# Patient Record
Sex: MALE | Race: WHITE | NOT HISPANIC OR LATINO | Employment: FULL TIME | URBAN - METROPOLITAN AREA
[De-identification: names, ages, dates, MRNs, and addresses within clinical notes are randomized per-mention and may not be internally consistent; named-entity substitution may affect disease eponyms.]

---

## 2024-03-28 ENCOUNTER — APPOINTMENT (OUTPATIENT)
Dept: LAB | Facility: CLINIC | Age: 39
End: 2024-03-28

## 2024-04-02 ENCOUNTER — APPOINTMENT (EMERGENCY)
Dept: RADIOLOGY | Facility: HOSPITAL | Age: 39
End: 2024-04-02
Payer: OTHER MISCELLANEOUS

## 2024-04-02 ENCOUNTER — HOSPITAL ENCOUNTER (EMERGENCY)
Facility: HOSPITAL | Age: 39
Discharge: HOME | End: 2024-04-02
Attending: EMERGENCY MEDICINE
Payer: OTHER MISCELLANEOUS

## 2024-04-02 VITALS
WEIGHT: 300 LBS | SYSTOLIC BLOOD PRESSURE: 115 MMHG | DIASTOLIC BLOOD PRESSURE: 71 MMHG | RESPIRATION RATE: 16 BRPM | HEIGHT: 67 IN | HEART RATE: 107 BPM | OXYGEN SATURATION: 95 % | TEMPERATURE: 98.1 F | BODY MASS INDEX: 47.09 KG/M2

## 2024-04-02 DIAGNOSIS — R51.9 HEADACHE: ICD-10-CM

## 2024-04-02 DIAGNOSIS — R68.84 JAW PAIN: ICD-10-CM

## 2024-04-02 DIAGNOSIS — M25.522 LEFT ELBOW PAIN: ICD-10-CM

## 2024-04-02 DIAGNOSIS — M25.512 LEFT SHOULDER PAIN: ICD-10-CM

## 2024-04-02 DIAGNOSIS — M54.2 NECK PAIN ON LEFT SIDE: Primary | ICD-10-CM

## 2024-04-02 DIAGNOSIS — S09.90XA TRAUMATIC INJURY OF HEAD, INITIAL ENCOUNTER: ICD-10-CM

## 2024-04-02 DIAGNOSIS — M54.50 ACUTE RIGHT-SIDED LOW BACK PAIN WITHOUT SCIATICA: ICD-10-CM

## 2024-04-02 LAB
ANION GAP SERPL CALCULATED.3IONS-SCNC: 9 MMOL/L (ref 4–13)
BASOPHILS # BLD AUTO: 0.05 THOUSANDS/ÂΜL (ref 0–0.1)
BASOPHILS NFR BLD AUTO: 1 % (ref 0–1)
BUN SERPL-MCNC: 12 MG/DL (ref 5–25)
CALCIUM SERPL-MCNC: 8.5 MG/DL (ref 8.4–10.2)
CHLORIDE SERPL-SCNC: 102 MMOL/L (ref 96–108)
CO2 SERPL-SCNC: 27 MMOL/L (ref 21–32)
CREAT SERPL-MCNC: 0.77 MG/DL (ref 0.6–1.3)
EOSINOPHIL # BLD AUTO: 0.15 THOUSAND/ÂΜL (ref 0–0.61)
EOSINOPHIL NFR BLD AUTO: 2 % (ref 0–6)
ERYTHROCYTE [DISTWIDTH] IN BLOOD BY AUTOMATED COUNT: 12.4 % (ref 11.6–15.1)
GFR SERPL CREATININE-BSD FRML MDRD: 113 ML/MIN/1.73SQ M
GLUCOSE SERPL-MCNC: 224 MG/DL (ref 65–140)
HCT VFR BLD AUTO: 43.8 % (ref 36.5–49.3)
HGB BLD-MCNC: 14.6 G/DL (ref 12–17)
IMM GRANULOCYTES # BLD AUTO: 0.05 THOUSAND/UL (ref 0–0.2)
IMM GRANULOCYTES NFR BLD AUTO: 1 % (ref 0–2)
LYMPHOCYTES # BLD AUTO: 2 THOUSANDS/ÂΜL (ref 0.6–4.47)
LYMPHOCYTES NFR BLD AUTO: 25 % (ref 14–44)
MCH RBC QN AUTO: 28.5 PG (ref 26.8–34.3)
MCHC RBC AUTO-ENTMCNC: 33.3 G/DL (ref 31.4–37.4)
MCV RBC AUTO: 86 FL (ref 82–98)
MONOCYTES # BLD AUTO: 0.5 THOUSAND/ÂΜL (ref 0.17–1.22)
MONOCYTES NFR BLD AUTO: 6 % (ref 4–12)
NEUTROPHILS # BLD AUTO: 5.21 THOUSANDS/ÂΜL (ref 1.85–7.62)
NEUTS SEG NFR BLD AUTO: 65 % (ref 43–75)
NRBC BLD AUTO-RTO: 0 /100 WBCS
PLATELET # BLD AUTO: 354 THOUSANDS/UL (ref 149–390)
PMV BLD AUTO: 9.7 FL (ref 8.9–12.7)
POTASSIUM SERPL-SCNC: 3.9 MMOL/L (ref 3.5–5.3)
RBC # BLD AUTO: 5.12 MILLION/UL (ref 3.88–5.62)
SODIUM SERPL-SCNC: 138 MMOL/L (ref 135–147)
WBC # BLD AUTO: 7.96 THOUSAND/UL (ref 4.31–10.16)

## 2024-04-02 PROCEDURE — 96374 THER/PROPH/DIAG INJ IV PUSH: CPT

## 2024-04-02 PROCEDURE — 80048 BASIC METABOLIC PNL TOTAL CA: CPT

## 2024-04-02 PROCEDURE — 85025 COMPLETE CBC W/AUTO DIFF WBC: CPT

## 2024-04-02 PROCEDURE — 99284 EMERGENCY DEPT VISIT MOD MDM: CPT

## 2024-04-02 PROCEDURE — 76775 US EXAM ABDO BACK WALL LIM: CPT | Performed by: EMERGENCY MEDICINE

## 2024-04-02 PROCEDURE — 70496 CT ANGIOGRAPHY HEAD: CPT

## 2024-04-02 PROCEDURE — 96375 TX/PRO/DX INJ NEW DRUG ADDON: CPT

## 2024-04-02 PROCEDURE — 70498 CT ANGIOGRAPHY NECK: CPT

## 2024-04-02 PROCEDURE — 36415 COLL VENOUS BLD VENIPUNCTURE: CPT

## 2024-04-02 PROCEDURE — 73030 X-RAY EXAM OF SHOULDER: CPT

## 2024-04-02 PROCEDURE — 99285 EMERGENCY DEPT VISIT HI MDM: CPT | Performed by: EMERGENCY MEDICINE

## 2024-04-02 PROCEDURE — 73080 X-RAY EXAM OF ELBOW: CPT

## 2024-04-02 RX ORDER — KETOROLAC TROMETHAMINE 30 MG/ML
15 INJECTION, SOLUTION INTRAMUSCULAR; INTRAVENOUS ONCE
Status: COMPLETED | OUTPATIENT
Start: 2024-04-02 | End: 2024-04-02

## 2024-04-02 RX ORDER — ACETAMINOPHEN 325 MG/1
650 TABLET ORAL ONCE
Status: COMPLETED | OUTPATIENT
Start: 2024-04-02 | End: 2024-04-02

## 2024-04-02 RX ORDER — ONDANSETRON 4 MG/1
1 TABLET, ORALLY DISINTEGRATING ORAL ONCE
Status: COMPLETED | OUTPATIENT
Start: 2024-04-02 | End: 2024-04-02

## 2024-04-02 RX ORDER — ONDANSETRON 2 MG/ML
4 INJECTION INTRAMUSCULAR; INTRAVENOUS ONCE
Status: COMPLETED | OUTPATIENT
Start: 2024-04-02 | End: 2024-04-02

## 2024-04-02 RX ADMIN — MORPHINE SULFATE 2 MG: 2 INJECTION, SOLUTION INTRAMUSCULAR; INTRAVENOUS at 18:22

## 2024-04-02 RX ADMIN — ONDANSETRON 4 MG: 2 INJECTION INTRAMUSCULAR; INTRAVENOUS at 18:18

## 2024-04-02 RX ADMIN — KETOROLAC TROMETHAMINE 15 MG: 30 INJECTION, SOLUTION INTRAMUSCULAR; INTRAVENOUS at 18:51

## 2024-04-02 RX ADMIN — ACETAMINOPHEN 650 MG: 325 TABLET, FILM COATED ORAL at 16:49

## 2024-04-02 RX ADMIN — IOHEXOL 85 ML: 350 INJECTION, SOLUTION INTRAVENOUS at 18:16

## 2024-04-02 NOTE — DISCHARGE INSTRUCTIONS
Take Tylenol 500 mg and ibuprofen 400 mg every 6 hours as needed for pain.  Follow-up with your primary care doctor.  Return to ER for any severe headache not controlled with at home medications, neck pain, or any new numbness or weakness.

## 2024-04-02 NOTE — ED PROVIDER NOTES
History  Chief Complaint   Patient presents with    Fall     Pt was at work and fell down 4 steps and fell on L side. Pt does not remember if he hit his head but denies LOC. C/o L head, neck, and shoulder pain.     39-year-old male presents with acute onset left head, left neck, left shoulder, nausea, lightheadedness, and right lower back pain after fall down several stairs earlier today.  Denies loss of consciousness.  Patient reports tripping over a cable that was struck across the stairs.  Denies changes in vision, weakness, numbness, or any other symptoms.        Prior to Admission Medications   Prescriptions Last Dose Informant Patient Reported? Taking?   ondansetron (ZOFRAN) 4 mg tablet   No No   Sig: Take 1 tablet by mouth every 8 (eight) hours as needed for nausea or vomiting.      Facility-Administered Medications: None       Past Medical History:   Diagnosis Date    Hypertension     Kidney stones        Past Surgical History:   Procedure Laterality Date    DENTAL SURGERY      LITHOTRIPSY      TONSILECTOMY AND ADNOIDECTOMY         History reviewed. No pertinent family history.  I have reviewed and agree with the history as documented.    E-Cigarette/Vaping     E-Cigarette/Vaping Substances     Social History     Tobacco Use    Smoking status: Never   Substance Use Topics    Alcohol use: No    Drug use: No        Review of Systems   Constitutional:  Negative for chills and fever.   HENT:  Negative for ear pain and sore throat.    Eyes:  Negative for pain.   Respiratory:  Negative for cough and shortness of breath.    Cardiovascular:  Negative for chest pain and palpitations.   Gastrointestinal:  Positive for nausea. Negative for abdominal pain and vomiting.   Genitourinary:  Negative for dysuria and hematuria.   Musculoskeletal:  Positive for arthralgias. Negative for back pain.   Skin:  Negative for color change and rash.   Neurological:  Negative for seizures and syncope.   All other systems reviewed and  are negative.      Physical Exam  ED Triage Vitals [04/02/24 1506]   Temperature Pulse Respirations Blood Pressure SpO2   98.1 °F (36.7 °C) (!) 112 16 152/90 95 %      Temp Source Heart Rate Source Patient Position - Orthostatic VS BP Location FiO2 (%)   Oral Monitor Lying Left arm --      Pain Score       7             Orthostatic Vital Signs  Vitals:    04/02/24 1506 04/02/24 1830   BP: 152/90 115/71   Pulse: (!) 112 (!) 107   Patient Position - Orthostatic VS: Lying Lying       Physical Exam  Vitals and nursing note reviewed.   Constitutional:       General: He is not in acute distress.     Appearance: Normal appearance. He is well-developed. He is not ill-appearing, toxic-appearing or diaphoretic.   HENT:      Head: Normocephalic.      Comments: Tenderness to left parietal area.  No overlying hematoma or ecchymosis.  Exquisite tenderness to left anterior neck.  No midline cervical tenderness.  No audible bruit.  Head rotation limited due to pain.  Tenderness at base of left mandible.  Failed tongue blade test.  Unable to clear cervical collar.     Right Ear: Tympanic membrane, ear canal and external ear normal.      Left Ear: Tympanic membrane, ear canal and external ear normal.      Nose: Nose normal.      Mouth/Throat:      Mouth: Mucous membranes are moist.      Pharynx: Oropharynx is clear.   Eyes:      General:         Right eye: No discharge.         Left eye: No discharge.      Extraocular Movements: Extraocular movements intact.      Conjunctiva/sclera: Conjunctivae normal.      Pupils: Pupils are equal, round, and reactive to light.   Neck:      Vascular: No carotid bruit.   Cardiovascular:      Rate and Rhythm: Regular rhythm. Tachycardia present.      Pulses: Normal pulses.      Heart sounds: Normal heart sounds. No murmur heard.  Pulmonary:      Effort: Pulmonary effort is normal. No respiratory distress.      Breath sounds: Normal breath sounds. No wheezing or rales.   Chest:      Chest wall: No  tenderness.   Abdominal:      General: Abdomen is flat. There is no distension.      Palpations: Abdomen is soft.      Tenderness: There is no abdominal tenderness. There is no right CVA tenderness, left CVA tenderness or guarding.   Musculoskeletal:         General: Tenderness present. No swelling.      Cervical back: Neck supple. Tenderness present.      Comments: Tenderness to left anterior shoulder.  Range of motion limited due to pain.  No obvious deformity.  Tenderness to lumbar area right of midline.  No bruising.  No midline tenderness.   Skin:     General: Skin is warm and dry.      Capillary Refill: Capillary refill takes less than 2 seconds.      Findings: No bruising.   Neurological:      Mental Status: He is alert and oriented to person, place, and time.      Cranial Nerves: No cranial nerve deficit.      Sensory: No sensory deficit.      Motor: No weakness.      Gait: Gait normal.   Psychiatric:         Mood and Affect: Mood normal.         Behavior: Behavior normal.         ED Medications  Medications   ondansetron (FOR EMS ONLY) (ZOFRAN-ODT) 4 mg dispersible tablet 4 mg (0 mg Does not apply Given to EMS 4/2/24 1509)   acetaminophen (TYLENOL) tablet 650 mg (650 mg Oral Given 4/2/24 1649)   ondansetron (ZOFRAN) injection 4 mg (4 mg Intravenous Given 4/2/24 1818)   morphine injection 2 mg (2 mg Intravenous Given 4/2/24 1822)   iohexol (OMNIPAQUE) 350 MG/ML injection (MULTI-DOSE) 85 mL (85 mL Intravenous Given 4/2/24 1816)   ketorolac (TORADOL) injection 15 mg (15 mg Intravenous Given 4/2/24 1851)       Diagnostic Studies  Results Reviewed       Procedure Component Value Units Date/Time    Basic metabolic panel [[id number]]  (Abnormal) Collected: 04/02/24 1638    Lab Status: Final result Specimen: Blood from Arm, Right Updated: 04/02/24 1712     Sodium 138 mmol/L      Potassium 3.9 mmol/L      Chloride 102 mmol/L      CO2 27 mmol/L      ANION GAP 9 mmol/L      BUN 12 mg/dL      Creatinine 0.77 mg/dL       Glucose 224 mg/dL      Calcium 8.5 mg/dL      eGFR 113 ml/min/1.73sq m     Narrative:      National Kidney Disease Foundation guidelines for Chronic Kidney Disease (CKD):     Stage 1 with normal or high GFR (GFR > 90 mL/min/1.73 square meters)    Stage 2 Mild CKD (GFR = 60-89 mL/min/1.73 square meters)    Stage 3A Moderate CKD (GFR = 45-59 mL/min/1.73 square meters)    Stage 3B Moderate CKD (GFR = 30-44 mL/min/1.73 square meters)    Stage 4 Severe CKD (GFR = 15-29 mL/min/1.73 square meters)    Stage 5 End Stage CKD (GFR <15 mL/min/1.73 square meters)  Note: GFR calculation is accurate only with a steady state creatinine    CBC and differential [[id number]] Collected: 04/02/24 1638    Lab Status: Final result Specimen: Blood from Arm, Right Updated: 04/02/24 1657     WBC 7.96 Thousand/uL      RBC 5.12 Million/uL      Hemoglobin 14.6 g/dL      Hematocrit 43.8 %      MCV 86 fL      MCH 28.5 pg      MCHC 33.3 g/dL      RDW 12.4 %      MPV 9.7 fL      Platelets 354 Thousands/uL      nRBC 0 /100 WBCs      Neutrophils Relative 65 %      Immature Grans % 1 %      Lymphocytes Relative 25 %      Monocytes Relative 6 %      Eosinophils Relative 2 %      Basophils Relative 1 %      Neutrophils Absolute 5.21 Thousands/µL      Absolute Immature Grans 0.05 Thousand/uL      Absolute Lymphocytes 2.00 Thousands/µL      Absolute Monocytes 0.50 Thousand/µL      Eosinophils Absolute 0.15 Thousand/µL      Basophils Absolute 0.05 Thousands/µL                    CTA head and neck with and without contrast   Final Result by Juliano Valentine MD (04/02 1829)      No evidence for high-grade stenosis, focal occlusion or vascular aneurysm of the cervical or intracranial vessels. No evidence for vascular injury.                  Workstation performed: [id number]         CT recon (no charge)   Final Result by Juliano Valentine MD (04/02 1834)      No acute fractures identified of the maxillofacial bones.               Workstation performed: [id number]         XR  shoulder 2+ views LEFT   ED Interpretation by Brandon Hutchins MD (04/02 1742)   No dislocation or AC separation      XR elbow 3+ vw LEFT   ED Interpretation by Brandon Hutchins MD (04/02 1742)   No fractures            Procedures  US Guided Peripheral IV    Date/Time: 4/2/2024 4:06 PM    Performed by: Brandon Hutchins MD  Authorized by: Brandon Hutchins MD    Patient location:  ED  Performed by:  Resident  Other Assisting Provider: No    Indications:     Indications: difficulty obtaining IV access    Procedure details:     Patient evaluated for contraindications to access (i.e. fistula, thrombosis, etc): Yes      Standard clean technique used for ultrasound access: Yes      Location:  Right forearm    Catheter size:  20 gauge    Number of attempts:  1    Successful placement: yes    Post-procedure details:     Post-procedure:  Dressing applied    Assessment: free fluid flow and no signs of infiltration      Post-procedure complications: none      Patient tolerance of procedure:  Tolerated well, no immediate complications  POC Renal US    Date/Time: 4/2/2024 3:33 PM    Performed by: Brandon Hutchins MD  Authorized by: Brandon Hutchins MD    Patient location:  ED  Performed by:  Resident  Other Assisting Provider: No    Procedure details:     Exam Type:  Diagnostic    Indications: flank/back pain      Scope of abdominal ultrasound: Free fluid in retroperitoneal space.    Views obtained: right kidney      Image quality: diagnostic      Image availability:  Images available in PACS and video obtained  Findings:     RIGHT kidney findings: unremarkable    Interpretation:     Renal ultrasound impressions: normal exam          ED Course                                       Medical Decision Making  39-year-old male with diffuse tenderness after mechanical fall down several stairs.  Will order imaging to assess for vascular injury of the neck, facial bone fracture, acute intracranial hemorrhage, acute  fractures.  Back pain is right of midline with no concern for vertebral fracture or spinal cord injury.    Workup negative for vascular injury, acute hemorrhage, acute fracture, dislocations.  Patient given return precautions.    Amount and/or Complexity of Data Reviewed  Labs: ordered.  Radiology: ordered and independent interpretation performed.    Risk  OTC drugs.  Prescription drug management.          Disposition  Final diagnoses:   Left shoulder pain   Left elbow pain   Acute right-sided low back pain without sciatica   Jaw pain   Headache   Traumatic injury of head, initial encounter   Neck pain on left side     Time reflects when diagnosis was documented in both MDM as applicable and the Disposition within this note       Time User Action Codes Description Comment    4/2/2024  6:38 PM Brandon Hutchins [M25.512] Left shoulder pain     4/2/2024  6:38 PM Brandon Hutchins [M25.522] Left elbow pain     4/2/2024  6:38 PM Brandon Hutchins [M54.50] Acute right-sided low back pain without sciatica     4/2/2024  6:39 PM Brandon Hutchnis [R68.84] Jaw pain     4/2/2024  6:39 PM Brandon Hutchins [R51.9] Headache     4/2/2024  6:39 PM Brandon Hutchins [S09.90XA] Traumatic injury of head, initial encounter     4/2/2024  6:39 PM Brandon Hutchins [M54.2] Neck pain on left side     4/2/2024  6:39 PM Brandon Hutchins Modify [M25.512] Left shoulder pain     4/2/2024  6:39 PM Brandon Hutchins Modify [M54.2] Neck pain on left side           ED Disposition       ED Disposition   Discharge    Condition   Stable    Date/Time   Tue Apr 2, 2024  6:38 PM    Comment   Randell Clark discharge to home/self care.                   Follow-up Information       Follow up With Specialties Details Why Contact Info    Yvette Grace MD Family Medicine   [facility] [place] [address]  [place] [address] [phone]              Discharge Medication List as of 4/2/2024  6:40 PM        CONTINUE these medications which have NOT CHANGED    Details    ondansetron (ZOFRAN) 4 mg tablet Take 1 tablet by mouth every 8 (eight) hours as needed for nausea or vomiting., Starting 9/15/2016, Until Discontinued, Print               PDMP Review       None             ED Provider  Attending physically available and evaluated Randell Clark. I managed the patient along with the ED Attending.    Electronically Signed by           Brandon Hutchins MD  04/02/24 6423

## 2024-04-02 NOTE — Clinical Note
Randell Clark was seen and treated in our emergency department on 4/2/2024.                Diagnosis:     Randell  .    He may return on this date: 04/05/2024    No lifting over 10 lbs for 1 week.     If you have any questions or concerns, please don't hesitate to call.      Brandon Hutchins MD    ______________________________           _______________          _______________  Hospital Representative                              Date                                Time

## 2024-04-02 NOTE — ED ATTENDING ATTESTATION
4/2/2024  I, Malu Burton MD, saw and evaluated the patient. I have discussed the patient with the resident/non-physician practitioner and agree with the resident's/non-physician practitioner's findings, Plan of Care, and MDM as documented in the resident's/non-physician practitioner's note, except where noted. All available labs and Radiology studies were reviewed.  I was present for key portions of any procedure(s) performed by the resident/non-physician practitioner and I was immediately available to provide assistance.       At this point I agree with the current assessment done in the Emergency Department.  I have conducted an independent evaluation of this patient a history and physical is as follows:  39-year-old with fall downstairs.  Patient lost his balance and fell down 4-5 steep stairs in a theater.  Patient states that he went headfirst, with the first area of impact being his left shoulder, head, neck.  No loss of consciousness.  Patient complains of head pain, neck pain, dizziness, left shoulder pain, and left side pain.  No vomiting.  No numbness, tingling, or weakness.  Has been ambulatory since the event.  Review of systems otherwise -12 systems reviewed.  No thinner use.  No significant comorbid illness.  On exam the patient is awake and alert.  He is tachycardic.  He has normal work of breathing, normal mentation, normal perfusion.  He does not have signs of trauma to the head or neck, but has a very tender left lateral neck.  He has tenderness overlying his left shoulder.  The patient does not have nystagmus, but complains of vertigo.  His heart is regular without murmurs, rubs, or gallops.  His lungs are clear with good air movement.  Abdomen soft and nontender with no masses, rebound, or guarding.  The patient's extremities are intact and he is neurovascular intact with normal skin and back exam.MEDICAL DECISION MAKING    Number and Complexity of Problems  Differential diagnosis: Fall,  intracranial traumatic injury, cervical fracture, vertebral dissection, shoulder fracture, doubt cardiogenic syncope    Medical Decision Making Data  External documents reviewed:   My EKG interpretation:   My CT interpretation:   My X-ray interpretation:   My ultrasound interpretation:     CT facial bones wo contrast    (Results Pending)   XR shoulder 2+ views LEFT    (Results Pending)   XR elbow 3+ vw LEFT    (Results Pending)   CTA head and neck with and without contrast    (Results Pending)       Labs Reviewed - No data to display    Labs reviewed by me are significant for:     Clinical decision rules/scores are significant for:     Discussed case with:   Considered admission for:     Treatment and Disposition  ED course: Seen and examined, collar applied.  Will plan to CT face, head and neck, shoulder and elbow  Shared decision making:   Code status:     ED Course         Critical Care Time  Procedures

## 2024-04-03 ENCOUNTER — TELEPHONE (OUTPATIENT)
Dept: PHYSICAL THERAPY | Facility: OTHER | Age: 39
End: 2024-04-03

## 2024-04-10 ENCOUNTER — APPOINTMENT (OUTPATIENT)
Dept: URGENT CARE | Age: 39
End: 2024-04-10
Payer: OTHER MISCELLANEOUS

## 2024-04-10 DIAGNOSIS — M54.50 ACUTE RIGHT-SIDED LOW BACK PAIN WITHOUT SCIATICA: Primary | ICD-10-CM

## 2024-04-10 PROCEDURE — 99213 OFFICE O/P EST LOW 20 MIN: CPT

## 2024-04-23 ENCOUNTER — APPOINTMENT (OUTPATIENT)
Dept: URGENT CARE | Age: 39
End: 2024-04-23
Payer: OTHER MISCELLANEOUS

## 2024-04-23 ENCOUNTER — APPOINTMENT (OUTPATIENT)
Dept: RADIOLOGY | Age: 39
End: 2024-04-23
Payer: COMMERCIAL

## 2024-04-23 DIAGNOSIS — M54.50 ACUTE RIGHT-SIDED LOW BACK PAIN WITHOUT SCIATICA: Primary | ICD-10-CM

## 2024-04-23 DIAGNOSIS — M54.50 ACUTE RIGHT-SIDED LOW BACK PAIN WITHOUT SCIATICA: ICD-10-CM

## 2024-04-23 PROCEDURE — 72100 X-RAY EXAM L-S SPINE 2/3 VWS: CPT

## 2024-04-23 PROCEDURE — 99213 OFFICE O/P EST LOW 20 MIN: CPT

## 2024-04-24 ENCOUNTER — NURSE TRIAGE (OUTPATIENT)
Dept: PHYSICAL THERAPY | Facility: OTHER | Age: 39
End: 2024-04-24

## 2024-04-24 DIAGNOSIS — M54.2 ACUTE NECK PAIN: Primary | ICD-10-CM

## 2024-04-24 DIAGNOSIS — M54.50 ACUTE RIGHT-SIDED LOW BACK PAIN WITHOUT SCIATICA: ICD-10-CM

## 2024-04-24 NOTE — TELEPHONE ENCOUNTER
Additional Information   Affirmative: Is this related to a work injury?   Negative: Is this related to an MVA?   Negative: Are you currently recieving homecare services?   Negative: Has the patient had unexplained weight loss?   Negative: Does the patient have a fever?   Negative: Is the patient experiencing urine retention?   Negative: Is the patient experiencing acute drop foot or paralysis?   Negative: Has the patient experienced major trauma? (fall from height, high speed collision, direct blow to spine) and is also experiencing nausea, light-headedness, or loss of consciousness?   Negative: Is the patient experiencing blood in sputum?   Negative: Is this a chronic condition?    Background - Initial Assessment  Clinical complaint: left sided neck pain and right sided low back pain. Non radiating. States these pains are related to a work injury. States no history of back or neck issues.  Date of onset: 4/2/24  Frequency of pain: constant  Quality of pain: sharp, shooting, and stabbing    Protocols used: [facility] Protocol    Patient states the injuries are related to work injury with an active claim.    Patient informed that we can offer him a referral to our [facility] for an evaluation. Patient agreeable to the referral.  Patient informed that I would be sending the referral to that office and they would be contacting him.    No further questions and/or concerns were voiced by the patient at this time. Patient states understanding of the referral that was placed.    Referral Closed.

## 2024-04-25 ENCOUNTER — TELEPHONE (OUTPATIENT)
Age: 39
End: 2024-04-25

## 2024-04-25 NOTE — TELEPHONE ENCOUNTER
Patient is waiting on worker's comp approval for chiropractic visits before scheduling an appt. Left call back number for when patient is ready to schedule.

## 2024-04-29 VITALS
HEART RATE: 93 BPM | BODY MASS INDEX: 48.18 KG/M2 | HEIGHT: 67 IN | WEIGHT: 307 LBS | DIASTOLIC BLOOD PRESSURE: 87 MMHG | SYSTOLIC BLOOD PRESSURE: 133 MMHG

## 2024-04-29 DIAGNOSIS — M75.82 ROTATOR CUFF TENDONITIS, LEFT: Primary | ICD-10-CM

## 2024-04-29 DIAGNOSIS — S46.812A STRAIN OF LEFT SUBSCAPULARIS MUSCLE, INITIAL ENCOUNTER: ICD-10-CM

## 2024-04-29 PROCEDURE — 99203 OFFICE O/P NEW LOW 30 MIN: CPT | Performed by: ORTHOPAEDIC SURGERY

## 2024-04-29 RX ORDER — SERTRALINE HYDROCHLORIDE 100 MG/1
1 TABLET, FILM COATED ORAL DAILY
COMMUNITY
Start: 2024-02-12

## 2024-04-29 RX ORDER — OMEPRAZOLE 10 MG/1
10 CAPSULE, DELAYED RELEASE ORAL DAILY
COMMUNITY

## 2024-04-29 RX ORDER — LOSARTAN POTASSIUM AND HYDROCHLOROTHIAZIDE 25; 100 MG/1; MG/1
1 TABLET ORAL DAILY
COMMUNITY
Start: 2024-03-18

## 2024-04-29 RX ORDER — HYDROCORTISONE 25 MG/G
CREAM TOPICAL 2 TIMES DAILY
COMMUNITY
Start: 2024-04-25 | End: 2024-05-05

## 2024-04-29 RX ORDER — METFORMIN HYDROCHLORIDE 500 MG/1
1 TABLET, EXTENDED RELEASE ORAL 2 TIMES DAILY WITH MEALS
COMMUNITY
Start: 2024-04-25

## 2024-04-29 RX ORDER — IBUPROFEN 200 MG
200 TABLET ORAL EVERY 6 HOURS PRN
COMMUNITY

## 2024-04-29 RX ORDER — CEPHALEXIN 500 MG/1
500 CAPSULE ORAL 3 TIMES DAILY
COMMUNITY
Start: 2024-04-25 | End: 2024-05-02

## 2024-04-29 RX ORDER — BUPROPION HYDROCHLORIDE 150 MG/1
1 TABLET ORAL EVERY MORNING
COMMUNITY
Start: 2024-02-12

## 2024-04-29 NOTE — PROGRESS NOTES
CHIEF COMPLAIN/REASON FOR VISIT  Chief Complaint   Patient presents with    Left Shoulder - Pain       HISTORY OF PRESENT ILLNESS  Randell Clark is a RHD 39 y.o. male who presents for evaluation of their left shoulder.  This is a Worker's Compensation case.  Patient said on April 22 he was carrying things down the stairs at his work when he fell and landed on the left shoulder.  Since then, patient has reported difficulty with overhead activity and behind the back maneuvers.  He reports some initial numbness and tingling which radiated down the arm but this is since resolved.    REVIEW OF SYSTEMS  Review of systems was performed and, woutside that mentioned in the HPI, it was negative for symptomology related to the integumentary, hematologic, immunologic, allergic, neurologic, cardiovascular, respiratory, GI or  systems.     MEDICAL HISTORY  There is no problem list on file for this patient.      SURGICAL HISTORY  Past Surgical History:   Procedure Laterality Date    DENTAL SURGERY      LITHOTRIPSY      TONSILECTOMY AND ADNOIDECTOMY         CURRENT MEDICATIONS    Current Outpatient Medications:     Anusol-HC 2.5 % rectal cream, Insert into the rectum 2 (two) times a day, Disp: , Rfl:     buPROPion (WELLBUTRIN XL) 150 mg 24 hr tablet, Take 1 tablet by mouth every morning, Disp: , Rfl:     cephalexin (KEFLEX) 500 mg capsule, Take 500 mg by mouth Three times a day, Disp: , Rfl:     ibuprofen (MOTRIN) 200 mg tablet, Take 200 mg by mouth every 6 (six) hours as needed, Disp: , Rfl:     LOSARTAN POTASSIUM-HCTZ PO, , Disp: , Rfl:     losartan-hydrochlorothiazide (HYZAAR) 100-25 MG per tablet, Take 1 tablet by mouth daily, Disp: , Rfl:     metFORMIN (GLUCOPHAGE-XR) 500 mg 24 hr tablet, Take 1 tablet by mouth 2 (two) times a day with meals, Disp: , Rfl:     omeprazole (PriLOSEC) 10 mg delayed release capsule, Take 10 mg by mouth daily, Disp: , Rfl:     sertraline (ZOLOFT) 100 mg tablet, Take 1 tablet by mouth daily, Disp:  ", Rfl:     ondansetron (ZOFRAN) 4 mg tablet, Take 1 tablet by mouth every 8 (eight) hours as needed for nausea or vomiting., Disp: 20 tablet, Rfl: 0    SOCIAL HISTORY  Social History     Socioeconomic History    Marital status: /Civil Union     Spouse name: Not on file    Number of children: Not on file    Years of education: Not on file    Highest education level: Not on file   Occupational History    Not on file   Tobacco Use    Smoking status: Never    Smokeless tobacco: Not on file   Substance and Sexual Activity    Alcohol use: No    Drug use: No    Sexual activity: Not on file   Other Topics Concern    Not on file   Social History Narrative    Not on file     Social Determinants of Health     Financial Resource Strain: Not on file   Food Insecurity: Not on file   Transportation Needs: Not on file   Physical Activity: Not on file   Stress: Not on file   Social Connections: Not on file   Intimate Partner Violence: Not on file   Housing Stability: Not on file       Objective     VITAL SIGNS  /87 (BP Location: Right arm, Patient Position: Sitting, Cuff Size: Large)   Pulse 93   Ht 5' 7\" (1.702 m) Comment: verbal  Wt (!) 139 kg (307 lb)   BMI 48.08 kg/m²      PHYSICAL EXAM  General:   Well-appearing  No acute distress  Appears stated age    Left Shoulder  Positive tenderness to palpation over the acromioclavicular joint  Negative tenderness to palpation over the long head of the biceps tendon  Shoulder effusion none present  Shoulder abduction 90 / 180 (passively up to 180)  Shoulder forward flexion 90 / 180 (passively up to 180)  Shoulder internal rotation T10  Supraspinatus/ABD 4/5   Infraspinatus/ER 4/5   Negative Belly Press/SS  Positive Neer  Positive Garcia  Positive O'bill  Skin is intact with no erythema, warmth or drainage  Motor strength intact distally  Sensation to light touch is normal in the axillary, radial, ulnar, and median nerve distributions.  Fingers warm and " perfused    RADIOGRAPHIC EXAMINATION/DIAGNOSTICS:      ASSESSMENT/PLAN:  Left rotator cuff tendonitis  Left subscapularis strain    Today, we discussed conservative treatment options including but are certainly not limited to: Rest, ice, compression, elevation, activity modification, anti-inflammatory medication, physical therapy, and/or injections.  We discussed benefits of each potential treatment option.  After discussion, patient was agreeable to trying Rest, Ice, Compression, Elevation, Activity Modification, Anti-inflammatory Medication, and Physical Therapy. He declined corticosteroid injection today. We did discuss if he fails conservative measures surgery could be considered in the future. We will plan to follow up with the patient as needed.  They are understanding that if the pain should worsen or they develop new symptoms to please reach out to us sooner. Patient is understanding and agreeable to this plan.     Scribe Attestation      I,:  Cuba Patel PA-C am acting as a scribe while in the presence of the attending physician.:       I,:  Garcia Boland MD personally performed the services described in this documentation    as scribed in my presence.:

## 2024-05-03 ENCOUNTER — APPOINTMENT (OUTPATIENT)
Dept: URGENT CARE | Age: 39
End: 2024-05-03
Payer: OTHER MISCELLANEOUS

## 2024-05-03 PROCEDURE — 99213 OFFICE O/P EST LOW 20 MIN: CPT

## 2024-05-08 ENCOUNTER — TELEPHONE (OUTPATIENT)
Dept: OBGYN CLINIC | Facility: MEDICAL CENTER | Age: 39
End: 2024-05-08

## 2024-05-08 NOTE — TELEPHONE ENCOUNTER
Caller: Rain    Doctor/Office: Krystian    CB#: [phone]      What needs to be faxed: [facility] 4/29    ATTN to: CHRIS ugerra    Fax#: [phone]      Documents were successfully e-faxed

## 2024-05-13 ENCOUNTER — APPOINTMENT (OUTPATIENT)
Dept: URGENT CARE | Age: 39
End: 2024-05-13
Payer: OTHER MISCELLANEOUS

## 2024-05-13 PROCEDURE — 99214 OFFICE O/P EST MOD 30 MIN: CPT | Performed by: PHYSICIAN ASSISTANT

## 2024-05-29 ENCOUNTER — APPOINTMENT (OUTPATIENT)
Dept: URGENT CARE | Age: 39
End: 2024-05-29
Payer: OTHER MISCELLANEOUS

## 2024-05-29 PROCEDURE — 99214 OFFICE O/P EST MOD 30 MIN: CPT | Performed by: PHYSICIAN ASSISTANT

## 2024-06-12 ENCOUNTER — APPOINTMENT (OUTPATIENT)
Dept: URGENT CARE | Age: 39
End: 2024-06-12
Payer: OTHER MISCELLANEOUS

## 2024-06-12 PROCEDURE — 99214 OFFICE O/P EST MOD 30 MIN: CPT | Performed by: PHYSICIAN ASSISTANT

## 2024-06-26 ENCOUNTER — TELEPHONE (OUTPATIENT)
Age: 39
End: 2024-06-26

## 2024-06-26 ENCOUNTER — TELEPHONE (OUTPATIENT)
Dept: OBGYN CLINIC | Facility: HOSPITAL | Age: 39
End: 2024-06-26

## 2024-06-26 NOTE — TELEPHONE ENCOUNTER
Caller: Michael     Doctor: Loco     Reason for call: Please mail new patient paperwork to address on file patient is schedule on 07/29th. Thank you     Call back#: [phone]

## 2024-06-26 NOTE — TELEPHONE ENCOUNTER
Caller: Jessica JEAN    Doctor: Krystian    Reason for call: Requesting update    Call back#: [phone]

## 2024-06-28 ENCOUNTER — APPOINTMENT (OUTPATIENT)
Dept: URGENT CARE | Age: 39
End: 2024-06-28
Payer: OTHER MISCELLANEOUS

## 2024-06-28 PROCEDURE — 99213 OFFICE O/P EST LOW 20 MIN: CPT | Performed by: PHYSICIAN ASSISTANT

## 2024-07-29 ENCOUNTER — OFFICE VISIT (OUTPATIENT)
Dept: PAIN MEDICINE | Facility: CLINIC | Age: 39
End: 2024-07-29
Payer: OTHER MISCELLANEOUS

## 2024-07-29 VITALS
DIASTOLIC BLOOD PRESSURE: 82 MMHG | HEART RATE: 123 BPM | BODY MASS INDEX: 48.18 KG/M2 | HEIGHT: 67 IN | SYSTOLIC BLOOD PRESSURE: 123 MMHG | WEIGHT: 307 LBS

## 2024-07-29 DIAGNOSIS — M51.34 DDD (DEGENERATIVE DISC DISEASE), THORACIC: ICD-10-CM

## 2024-07-29 DIAGNOSIS — M48.04 THORACIC STENOSIS: ICD-10-CM

## 2024-07-29 DIAGNOSIS — M48.061 SPINAL STENOSIS OF LUMBAR REGION, UNSPECIFIED WHETHER NEUROGENIC CLAUDICATION PRESENT: ICD-10-CM

## 2024-07-29 DIAGNOSIS — M54.16 LUMBAR RADICULOPATHY: Primary | ICD-10-CM

## 2024-07-29 PROCEDURE — 99204 OFFICE O/P NEW MOD 45 MIN: CPT | Performed by: ANESTHESIOLOGY

## 2024-07-29 RX ORDER — GABAPENTIN 300 MG/1
CAPSULE ORAL
Qty: 90 CAPSULE | Refills: 1 | Status: SHIPPED | OUTPATIENT
Start: 2024-07-29 | End: 2024-09-03

## 2024-07-29 NOTE — PROGRESS NOTES
Assessment  1. Lumbar radiculopathy    2. Thoracic stenosis    3. DDD (degenerative disc disease), thoracic    4. Spinal stenosis of lumbar region, unspecified whether neurogenic claudication present        Plan  39-year-old male referred by occupational medicine regarding work-related injury sustained April 2, 2024 when the patient had a fall downstairs.  The patient is experiencing right-sided lumbosacral back pain that radiates into the right groin and anterior lateral aspect of the right lower extremity to the knee with associated paresthesias.  MRI of the lumbar spine demonstrates multiple disc bulges.  He does have a left-sided disc bulge/herniation at T11-12 contacting the anterior aspect of the left cord without any myelomalacia. Mild central stenosis at L1-2.  Mild to moderate central and right foraminal stenosis at L2-3.  Slight encroachment of the left foramen at this level.  Right extraforaminal disc protrusion with mild central and bilateral foraminal stenosis at L3-4.  Mild central with mild to moderate bilateral foraminal stenosis at L4-5.  The patient has been doing physical therapy since April 2024 without any significant improvement in pain or function.  He does take ibuprofen as needed with minimal relief.  Flexeril provided no relief.  The patient symptoms are consistent with lumbar radiculopathy.    I will schedule the patient for left L2 and L3 TFESI  I will initiate gabapentin 300 mg nightly and will titrate up to 3 times daily for his neuropathic complaints.  He was apprised of the most common side effects of gabapentin and he was given a titration schedule today's office visit  Patient may continue with physical therapy they feel he would benefit from [place] based exercises  I will follow-up with the patient in 4 weeks      Complete risks and benefits including bleeding, infection, tissue reaction, nerve injury and allergic reaction were discussed. The approach was demonstrated using  models and literature was provided. Verbal and written consent was obtained.    My impressions and treatment recommendations were discussed in detail with the patient who verbalized understanding and had no further questions.  Discharge instructions were provided. I personally saw and examined the patient and I agree with the above discussed plan of care.    No orders of the defined types were placed in this encounter.    No orders of the defined types were placed in this encounter.      History of Present Illness    Randell Clark is a 39 y.o. male referred by occupational medicine regarding work-related injury sustained April 2, 2024 when the patient had a fall downstairs.  The patient is experiencing right-sided lumbosacral back pain that radiates into the right groin and anterior lateral aspect of the right lower extremity to the knee with associated paresthesias.  He denies any left lower extremity symptoms, bladder or bowel incontinence, or saddle anesthesia.  MRI of the lumbar spine demonstrates multiple disc bulges.  He does have a left-sided disc bulge/herniation at T11-12 contacting the anterior aspect of the left cord without any myelomalacia. Mild central stenosis at L1-2.  Mild to moderate central and right foraminal stenosis at L2-3.  Slight encroachment of the left foramen at this level.  Right extraforaminal disc protrusion with mild central and bilateral foraminal stenosis at L3-4.  Mild central with mild to moderate bilateral foraminal stenosis at L4-5.  The patient has been doing physical therapy since April 2024 without any significant improvement in pain or function.  He does take ibuprofen as needed with minimal relief.  Flexeril provided no relief.  The patient rates his pain a 4 out of 10 and constant.  The pain is worse in the afternoon.  The pain is described as burning, shooting, pins-and-needles, dull, and aching.  The pain is increased with lying down, standing, bending, sitting, walking,  exercise, and sneezing.  He does find some relief with heat and ice.    Other than as stated above, the patient denies any interval changes in medications, medical condition, mental condition, symptoms, or allergies since the last office visit.    I have personally reviewed and/or updated the patient's past medical history, past surgical history, family history, social history, current medications, allergies, and vital signs today.     Review of Systems    There is no problem list on file for this patient.      Past Medical History:   Diagnosis Date    Hypertension     Kidney stones        Past Surgical History:   Procedure Laterality Date    DENTAL SURGERY      LITHOTRIPSY      TONSILECTOMY AND ADNOIDECTOMY         Family History   Problem Relation Age of Onset    Cancer Mother     Diabetes type II Brother     Sjogren's syndrome Maternal Grandmother     Lupus Maternal Grandmother     Rheum arthritis Maternal Grandmother     Cancer Maternal Grandfather     Anuerysm Paternal Grandfather         Aortic Brain Anuerysm       Social History     Occupational History    Not on file   Tobacco Use    Smoking status: Never    Smokeless tobacco: Not on file   Substance and Sexual Activity    Alcohol use: No    Drug use: No    Sexual activity: Not on file       Current Outpatient Medications on File Prior to Visit   Medication Sig    Anusol-HC 2.5 % rectal cream Insert into the rectum 2 (two) times a day    buPROPion (WELLBUTRIN XL) 150 mg 24 hr tablet Take 1 tablet by mouth every morning    ibuprofen (MOTRIN) 200 mg tablet Take 200 mg by mouth every 6 (six) hours as needed    LOSARTAN POTASSIUM-HCTZ PO     losartan-hydrochlorothiazide (HYZAAR) 100-25 MG per tablet Take 1 tablet by mouth daily    metFORMIN (GLUCOPHAGE-XR) 500 mg 24 hr tablet Take 1 tablet by mouth 2 (two) times a day with meals    omeprazole (PriLOSEC) 10 mg delayed release capsule Take 10 mg by mouth daily    ondansetron (ZOFRAN) 4 mg tablet Take 1 tablet by  "mouth every 8 (eight) hours as needed for nausea or vomiting.    sertraline (ZOLOFT) 100 mg tablet Take 1 tablet by mouth daily     No current facility-administered medications on file prior to visit.       Allergies   Allergen Reactions    Naproxen Other (See Comments)     Mouth sores       Physical Exam    /82   Pulse (!) 123   Ht 5' 7\" (1.702 m)   Wt (!) 139 kg (307 lb)   BMI 48.08 kg/m²     Constitutional: normal, well developed, well nourished, alert, in no distress and non-toxic and no overt pain behavior.  Eyes: anicteric  HEENT: grossly intact  Neck: supple, symmetric, trachea midline and no masses   Pulmonary:even and unlabored  Cardiovascular:No edema or pitting edema present  Skin:Normal without rashes or lesions and well hydrated  Psychiatric:Mood and affect appropriate  Neurologic:Cranial Nerves II-XII grossly intact  Musculoskeletal:antalgic gait.  Right lumbar paraspinals tender to palpation from L2-L5.  Right SI joint mildly tender to palpation.  Bilateral patellar and Achilles reflexes were 2 out of 4 and symmetrical.  No clonus is noted bilaterally.  Bilateral lower extremity strength out of 5 in all muscle groups with the exception of right quadriceps which is 4-5.  Sensation intact to light touch in L3-S1 dermatomes bilaterally.  Positive straight leg raise on the right and negative on the left.  Positive Poncho's test on the right and negative on the left.    Imaging  PACS Images     Show images for XR spine lumbar 2 or 3 views injury  Study Result    Narrative & Impression   XR SPINE LUMBAR 2 OR 3 VIEWS INJURY     INDICATION: M54.50: Low back pain, unspecified.     COMPARISON: None     FINDINGS:     No acute fracture. Intact pedicles.     Five non-rib-bearing lumbar vertebral bodies.     Normal alignment.     No significant degenerative changes.     Unremarkable soft tissues. Right upper quadrant surgical clips     IMPRESSION:     No acute osseous abnormality.        Workstation " performed: [id number]       PACS Images     Show images for MRI lumbar spine wo contrast  Study Result    Narrative & Impression   [id number]     Imaging    MRI lumbar spine wo contrast (Order: [id number]) - 7/29/2024    Order Report     Order Details

## 2024-08-09 ENCOUNTER — APPOINTMENT (OUTPATIENT)
Dept: URGENT CARE | Age: 39
End: 2024-08-09
Payer: OTHER MISCELLANEOUS

## 2024-08-09 PROCEDURE — 99213 OFFICE O/P EST LOW 20 MIN: CPT | Performed by: PHYSICIAN ASSISTANT

## 2024-09-06 ENCOUNTER — APPOINTMENT (OUTPATIENT)
Age: 39
End: 2024-09-06
Payer: OTHER MISCELLANEOUS

## 2024-09-06 PROCEDURE — 99213 OFFICE O/P EST LOW 20 MIN: CPT | Performed by: EMERGENCY MEDICINE

## 2024-09-10 ENCOUNTER — HOSPITAL ENCOUNTER (OUTPATIENT)
Dept: RADIOLOGY | Facility: CLINIC | Age: 39
Discharge: HOME | End: 2024-09-10
Admitting: ANESTHESIOLOGY
Payer: OTHER MISCELLANEOUS

## 2024-09-10 VITALS
OXYGEN SATURATION: 95 % | SYSTOLIC BLOOD PRESSURE: 130 MMHG | RESPIRATION RATE: 18 BRPM | TEMPERATURE: 98.4 F | DIASTOLIC BLOOD PRESSURE: 88 MMHG | HEART RATE: 103 BPM

## 2024-09-10 DIAGNOSIS — M54.16 LUMBAR RADICULOPATHY: ICD-10-CM

## 2024-09-10 PROCEDURE — 64484 NJX AA&/STRD TFRM EPI L/S EA: CPT | Performed by: ANESTHESIOLOGY

## 2024-09-10 PROCEDURE — 64483 NJX AA&/STRD TFRM EPI L/S 1: CPT | Performed by: ANESTHESIOLOGY

## 2024-09-10 RX ORDER — PAPAVERINE HCL 150 MG
15 CAPSULE, EXTENDED RELEASE ORAL ONCE
Status: COMPLETED | OUTPATIENT
Start: 2024-09-10 | End: 2024-09-10

## 2024-09-10 RX ADMIN — DEXAMETHASONE SODIUM PHOSPHATE 15 MG: 10 INJECTION, SOLUTION INTRAMUSCULAR; INTRAVENOUS at 10:37

## 2024-09-10 RX ADMIN — LIDOCAINE HYDROCHLORIDE 2 ML: 20 INJECTION, SOLUTION EPIDURAL; INFILTRATION; INTRACAUDAL; PERINEURAL at 10:32

## 2024-09-10 RX ADMIN — IOHEXOL 2 ML: 300 INJECTION, SOLUTION INTRAVENOUS at 10:35

## 2024-09-10 NOTE — DISCHARGE INSTRUCTIONS
Epidural Steroid Injection   WHAT YOU NEED TO KNOW:   An epidural steroid injection (TRISTON) is a procedure to inject steroid medicine into the epidural space. The epidural space is between your spinal cord and vertebrae. Steroids reduce inflammation and fluid buildup in your spine that may be causing pain. You may be given pain medicine along with the steroids.          ACTIVITY  Do not drive or operate machinery today.  No strenuous activity today - bending, lifting, etc.  You may resume normal activites starting tomorrow - start slowly and as tolerated.  You may shower today, but no tub baths or hot tubs.  You may have numbness for several hours from the local anesthetic. Please use caution and common sense, especially with weight-bearing activities.    CARE OF THE INJECTION SITE  If you have soreness or pain, apply ice to the area today (20 minutes on/20 minutes off).  Starting tomorrow, you may use warm, moist heat or ice if needed.  You may have an increase or change in your discomfort for 36-48 hours after your treatment.  Apply ice and continue with any pain medication you have been prescribed.  Notify the [facility] if you have any of the following: redness, drainage, swelling, headache, stiff neck or fever above 100°F.    SPECIAL INSTRUCTIONS  Our office will contact you in approximately 14 days for a progress report.    MEDICATIONS  Continue to take all routine medications.  Our office may have instructed you to hold some medications.    As no general anesthesia was used in today's procedure, you should not experience any side effects related to anesthesia.     If you are diabetic, the steroids used in today's injection may temporarily increase your blood sugar levels after the first few days after your injection. Please keep a close eye on your sugars and alert the doctor who manages your diabetes if your sugars are significantly high from your baseline or you are symptomatic.     If you have a  problem specifically related to your procedure, please call our office at [phone].  Problems not related to your procedure should be directed to your primary care physician.

## 2024-09-10 NOTE — H&P
History of Present Illness: The patient is a 39 y.o. male who presents with complaints of low back and leg pain.    Past Medical History:   Diagnosis Date    Hypertension     Kidney stones        Past Surgical History:   Procedure Laterality Date    DENTAL SURGERY      LITHOTRIPSY      TONSILECTOMY AND ADNOIDECTOMY           Current Outpatient Medications:     Anusol-HC 2.5 % rectal cream, Insert into the rectum 2 (two) times a day, Disp: , Rfl:     buPROPion (WELLBUTRIN XL) 150 mg 24 hr tablet, Take 1 tablet by mouth every morning, Disp: , Rfl:     gabapentin (NEURONTIN) 300 mg capsule, Take 1 capsule (300 mg total) by mouth daily at bedtime for 3 days, THEN 1 capsule (300 mg total) 2 (two) times a day for 3 days, THEN 1 capsule (300 mg total) 3 (three) times a day., Disp: 90 capsule, Rfl: 1    ibuprofen (MOTRIN) 200 mg tablet, Take 200 mg by mouth every 6 (six) hours as needed, Disp: , Rfl:     LOSARTAN POTASSIUM-HCTZ PO, , Disp: , Rfl:     losartan-hydrochlorothiazide (HYZAAR) 100-25 MG per tablet, Take 1 tablet by mouth daily, Disp: , Rfl:     metFORMIN (GLUCOPHAGE-XR) 500 mg 24 hr tablet, Take 1 tablet by mouth 2 (two) times a day with meals, Disp: , Rfl:     omeprazole (PriLOSEC) 10 mg delayed release capsule, Take 10 mg by mouth daily, Disp: , Rfl:     ondansetron (ZOFRAN) 4 mg tablet, Take 1 tablet by mouth every 8 (eight) hours as needed for nausea or vomiting., Disp: 20 tablet, Rfl: 0    sertraline (ZOLOFT) 100 mg tablet, Take 1 tablet by mouth daily, Disp: , Rfl:     Allergies   Allergen Reactions    Naproxen Other (See Comments)     Mouth sores       Physical Exam:   Vitals:    09/10/24 1013   BP: 133/94   Pulse: (!) 107   Resp: 17   Temp: 98.4 °F (36.9 °C)   SpO2: 96%     General: Awake, Alert, Oriented x 3, Mood and affect appropriate  Respiratory: Respirations even and unlabored  Cardiovascular: Peripheral pulses intact; no edema  Musculoskeletal Exam: Normal gait    ASA Score: 2    Patient/Chart  Verification  Patient ID Verified: Verbal  ID Band Applied: No  Consents Confirmed: Procedural  H&P( within 30 days) Verified: To be obtained in the Procedural area  Allergies Reviewed: Yes  Anticoag/NSAID held?: NA  Currently on antibiotics?: No    Assessment:   1. Lumbar radiculopathy        Plan: Right L2 and L3 TFESI

## 2024-09-10 NOTE — DISCHARGE INSTR - LAB
Epidural Steroid Injection   WHAT YOU NEED TO KNOW:   An epidural steroid injection (TRISTON) is a procedure to inject steroid medicine into the epidural space. The epidural space is between your spinal cord and vertebrae. Steroids reduce inflammation and fluid buildup in your spine that may be causing pain. You may be given pain medicine along with the steroids.          ACTIVITY  Do not drive or operate machinery today.  No strenuous activity today - bending, lifting, etc.  You may resume normal activites starting tomorrow - start slowly and as tolerated.  You may shower today, but no tub baths or hot tubs.  You may have numbness for several hours from the local anesthetic. Please use caution and common sense, especially with weight-bearing activities.    CARE OF THE INJECTION SITE  If you have soreness or pain, apply ice to the area today (20 minutes on/20 minutes off).  Starting tomorrow, you may use warm, moist heat or ice if needed.  You may have an increase or change in your discomfort for 36-48 hours after your treatment.  Apply ice and continue with any pain medication you have been prescribed.  Notify the [facility] if you have any of the following: redness, drainage, swelling, headache, stiff neck or fever above 100°F.    SPECIAL INSTRUCTIONS  Our office will contact you in approximately 14 days for a progress report.    MEDICATIONS  Continue to take all routine medications.  Our office may have instructed you to hold some medications.    As no general anesthesia was used in today's procedure, you should not experience any side effects related to anesthesia.     If you are diabetic, the steroids used in today's injection may temporarily increase your blood sugar levels after the first few days after your injection. Please keep a close eye on your sugars and alert the doctor who manages your diabetes if your sugars are significantly high from your baseline or you are symptomatic.     If you have a  problem specifically related to your procedure, please call our office at [phone].  Problems not related to your procedure should be directed to your primary care physician.

## 2024-09-11 ENCOUNTER — TELEPHONE (OUTPATIENT)
Age: 39
End: 2024-09-11

## 2024-09-11 NOTE — TELEPHONE ENCOUNTER
Caller: Jessica lovelace/ [employer]     Doctor:      Reason for call: Please fax procedure notes to her at: [phone]     Call back#: [phone]

## 2024-09-23 ENCOUNTER — APPOINTMENT (OUTPATIENT)
Age: 39
End: 2024-09-23
Payer: OTHER MISCELLANEOUS

## 2024-09-23 PROCEDURE — 99215 OFFICE O/P EST HI 40 MIN: CPT | Performed by: PREVENTIVE MEDICINE

## 2024-09-24 ENCOUNTER — TELEPHONE (OUTPATIENT)
Dept: PAIN MEDICINE | Facility: CLINIC | Age: 39
End: 2024-09-24

## 2024-10-14 ENCOUNTER — APPOINTMENT (OUTPATIENT)
Age: 39
End: 2024-10-14
Payer: OTHER MISCELLANEOUS

## 2024-10-14 PROCEDURE — 99213 OFFICE O/P EST LOW 20 MIN: CPT | Performed by: PREVENTIVE MEDICINE

## 2024-11-06 NOTE — PROGRESS NOTES
Assessment:  1. Chronic bilateral low back pain with right-sided sciatica    2. Mid back pain    3. Myofascial pain syndrome        Plan:  Patient will be scheduled for trigger point injections into the thoracic paraspinal musculature  We can repeat right L2 and L3 TFESI as needed  Patient may continue physical therapy for mid back and low back complaints.  New referral provided today  Patient may continue over-the-counter ibuprofen and Tylenol as needed  Patient may continue TENS unit  Follow-up after procedure or sooner if needed    History of Present Illness:    The patient is a 39 y.o. male last seen on 07/29/2024  who presents for a follow up office visit in regards to chronic right-sided low back pain that radiated into the right groin and anterior lateral aspect of the right lower extremity to the knee after he suffered a fall at work in April 2024.  Patient is status post right L2 and L3 TFESI September 10, 2024 with an ongoing 50 to 75% improvement of his pain.  He also complains of thoracic back pain near the scapula and midline.  He has tried muscle relaxants without much relief.  He does find some relief with massage and TENS.  He denies any radiating pain into the thorax or chest wall.  Patient has been involved in physical therapy for low back pain.  He does feel like his pain has been improving and the injection has allowed him to participate to a ruff extent during physical therapy    The patient rates his pain a 3 out of 10 on the numeric pain rating scale.  Pain is constant in the morning and at night and is described as burning, dull aching, sharp and pins-and-needles    I have personally reviewed and/or updated the patient's past medical history, past surgical history, family history, social history, current medications, allergies, and vital signs today.       Review of Systems:    Review of Systems   Respiratory:  Negative for shortness of breath.    Cardiovascular:  Negative for chest pain.    Gastrointestinal:  Negative for constipation, diarrhea, nausea and vomiting.   Musculoskeletal:  Positive for back pain and gait problem. Negative for arthralgias, joint swelling and myalgias.   Skin:  Negative for rash.   Neurological:  Negative for dizziness, seizures and weakness.   All other systems reviewed and are negative.        Past Medical History:   Diagnosis Date    Hypertension     Kidney stones        Past Surgical History:   Procedure Laterality Date    DENTAL SURGERY      LITHOTRIPSY      TONSILECTOMY AND ADNOIDECTOMY         Family History   Problem Relation Age of Onset    Cancer Mother     Diabetes type II Brother     Sjogren's syndrome Maternal Grandmother     Lupus Maternal Grandmother     Rheum arthritis Maternal Grandmother     Cancer Maternal Grandfather     Anuerysm Paternal Grandfather         Aortic Brain Anuerysm       Social History     Occupational History    Not on file   Tobacco Use    Smoking status: Never    Smokeless tobacco: Not on file   Substance and Sexual Activity    Alcohol use: No    Drug use: No    Sexual activity: Not on file         Current Outpatient Medications:     buPROPion (WELLBUTRIN XL) 150 mg 24 hr tablet, Take 1 tablet by mouth every morning, Disp: , Rfl:     ibuprofen (MOTRIN) 200 mg tablet, Take 200 mg by mouth every 6 (six) hours as needed, Disp: , Rfl:     losartan-hydrochlorothiazide (HYZAAR) 100-25 MG per tablet, Take 1 tablet by mouth daily, Disp: , Rfl:     metFORMIN (GLUCOPHAGE-XR) 500 mg 24 hr tablet, Take 1 tablet by mouth 2 (two) times a day with meals, Disp: , Rfl:     omeprazole (PriLOSEC) 10 mg delayed release capsule, Take 10 mg by mouth daily, Disp: , Rfl:     sertraline (ZOLOFT) 100 mg tablet, Take 1 tablet by mouth daily, Disp: , Rfl:     Anusol-HC 2.5 % rectal cream, Insert into the rectum 2 (two) times a day, Disp: , Rfl:     LOSARTAN POTASSIUM-HCTZ PO, , Disp: , Rfl:     ondansetron (ZOFRAN) 4 mg tablet, Take 1 tablet by mouth every 8  "(eight) hours as needed for nausea or vomiting., Disp: 20 tablet, Rfl: 0    Allergies   Allergen Reactions    Naproxen Other (See Comments)     Mouth sores       Physical Exam:    /80   Pulse (!) 132   Ht 5' 7\" (1.702 m)   Wt (!) 139 kg (307 lb)   BMI 48.08 kg/m²     Constitutional:normal, well developed, well nourished, alert, in no distress and non-toxic and no overt pain behavior.  Eyes:anicteric  HEENT:grossly intact  Neck:supple, symmetric, trachea midline and no masses   Pulmonary:even and unlabored  Cardiovascular:No edema or pitting edema present  Skin:Normal without rashes or lesions and well hydrated  Psychiatric:Mood and affect appropriate  Neurologic:Cranial Nerves II-XII grossly intact  Musculoskeletal:normal gait.  Thoracic paraspinal musculature tender to palpation T5-T7.       Imaging  No orders to display         Orders Placed This Encounter   Procedures    Ambulatory referral to Physical Therapy       "

## 2024-11-07 ENCOUNTER — OFFICE VISIT (OUTPATIENT)
Dept: PAIN MEDICINE | Facility: CLINIC | Age: 39
End: 2024-11-07
Payer: OTHER MISCELLANEOUS

## 2024-11-07 VITALS
HEART RATE: 132 BPM | DIASTOLIC BLOOD PRESSURE: 80 MMHG | HEIGHT: 67 IN | WEIGHT: 307 LBS | SYSTOLIC BLOOD PRESSURE: 112 MMHG | BODY MASS INDEX: 48.18 KG/M2

## 2024-11-07 DIAGNOSIS — M79.18 MYOFASCIAL PAIN SYNDROME: ICD-10-CM

## 2024-11-07 DIAGNOSIS — M54.41 CHRONIC BILATERAL LOW BACK PAIN WITH RIGHT-SIDED SCIATICA: Primary | ICD-10-CM

## 2024-11-07 DIAGNOSIS — M54.9 MID BACK PAIN: ICD-10-CM

## 2024-11-07 DIAGNOSIS — G89.29 CHRONIC BILATERAL LOW BACK PAIN WITH RIGHT-SIDED SCIATICA: Primary | ICD-10-CM

## 2024-11-07 PROCEDURE — 99214 OFFICE O/P EST MOD 30 MIN: CPT | Performed by: NURSE PRACTITIONER

## 2024-11-08 ENCOUNTER — TELEPHONE (OUTPATIENT)
Age: 39
End: 2024-11-08

## 2024-11-08 NOTE — TELEPHONE ENCOUNTER
Caller: [employer]    Doctor/Office: Dr Adan    CB#: N/A    What needs to be faxed: Office visit after summary 11/7/24    ATTN to: Yessica    Fax#: [phone]      Documents were successfully e-faxed     20:14

## 2024-11-18 ENCOUNTER — APPOINTMENT (OUTPATIENT)
Age: 39
End: 2024-11-18
Payer: OTHER MISCELLANEOUS

## 2024-11-18 PROCEDURE — 99214 OFFICE O/P EST MOD 30 MIN: CPT | Performed by: PREVENTIVE MEDICINE

## 2024-12-19 ENCOUNTER — PROCEDURE VISIT (OUTPATIENT)
Dept: PAIN MEDICINE | Facility: CLINIC | Age: 39
End: 2024-12-19
Payer: OTHER MISCELLANEOUS

## 2024-12-19 VITALS — WEIGHT: 311 LBS | HEIGHT: 67 IN | BODY MASS INDEX: 48.81 KG/M2

## 2024-12-19 DIAGNOSIS — M79.18 MYOFASCIAL PAIN SYNDROME: Primary | ICD-10-CM

## 2024-12-19 PROCEDURE — 20553 NJX 1/MLT TRIGGER POINTS 3/>: CPT | Performed by: ANESTHESIOLOGY

## 2024-12-19 PROCEDURE — 76942 ECHO GUIDE FOR BIOPSY: CPT | Performed by: ANESTHESIOLOGY

## 2024-12-19 RX ORDER — ROPIVACAINE HYDROCHLORIDE 2 MG/ML
40 INJECTION, SOLUTION EPIDURAL; INFILTRATION; PERINEURAL ONCE
Status: COMPLETED | OUTPATIENT
Start: 2024-12-19 | End: 2024-12-19

## 2024-12-19 RX ORDER — METHYLPREDNISOLONE ACETATE 40 MG/ML
40 INJECTION, SUSPENSION INTRA-ARTICULAR; INTRALESIONAL; INTRAMUSCULAR; SOFT TISSUE ONCE
Status: COMPLETED | OUTPATIENT
Start: 2024-12-19 | End: 2024-12-19

## 2024-12-19 RX ADMIN — ROPIVACAINE HYDROCHLORIDE 40 MG: 2 INJECTION, SOLUTION EPIDURAL; INFILTRATION; PERINEURAL at 14:53

## 2024-12-19 RX ADMIN — METHYLPREDNISOLONE ACETATE 40 MG: 40 INJECTION, SUSPENSION INTRA-ARTICULAR; INTRALESIONAL; INTRAMUSCULAR; SOFT TISSUE at 14:53

## 2024-12-19 NOTE — PROGRESS NOTES
Indication: Back pain  Preoperative diagnosis: Myofascial pain  Postoperative diagnosis: Myofascial pain  Procedure: Ultrasound guided left trapezius, bilateral thoracic paraspinal musculature, and right lumbar paraspinal musculature trigger point injection(s)    After discussing the risks, benefits, and alternatives to the procedure, the patient expressed understanding and wished to proceed. The patient was brought to the procedure suite and placed in the prone position. A procedural pause was conducted to verify: Correct patient identity, procedure to be performed and as applicable, correct side and site, correct patient position, and availability of implants, special equipment or special requirements. A simple surgical tray was used. Prior to the procedure, the left trapezius, bilateral thoracic paraspinal musculature, and right lumbar paraspinal musculature musculature was examined with a 12MHz linear transducer to visualize the musculature and determine the optimal needle. Following this, the area was prepped with ChloraPrep scrub, then reexamined using the same transducer, a sterile ultrasound transducer cover, and sterile ultrasound transducer gel. Thereafter, using ultrasound guidance, a 2.5 inch 25 guage needle was advanced into the each trigger point. After visualization of the tip and negative aspiration of blood, air, or bodily fluids; 1cc of 0.2% ropivacaine and 4 mg of Depo-Medrol was injected into each trigger point.  A total of 9 trigger points were injected. The patient tolerated the procedure well and there were no apparent complications. After an appropriate amount of observation, the patient was dismissed from the recovery area in good condition under their own power.    The patient received a total steroid dose today of 36 mg of Depo-Medrol.

## 2024-12-20 ENCOUNTER — TELEPHONE (OUTPATIENT)
Age: 39
End: 2024-12-20

## 2024-12-20 NOTE — TELEPHONE ENCOUNTER
Caller: MIRANDA Juan     Doctor: THANG    Reason for call: would like note of procedure from today faxed    Fax# [phone]    Call back#:   [phone]

## 2024-12-26 NOTE — TELEPHONE ENCOUNTER
Caller: MIRANDA Juan      Doctor: THANG     Reason for call: procedure visit notes from 12/19/24. Faxed    Fax# [phone]     Call back#:   [phone]

## 2025-01-02 ENCOUNTER — TELEPHONE (OUTPATIENT)
Dept: PAIN MEDICINE | Facility: CLINIC | Age: 40
End: 2025-01-02

## 2025-01-06 ENCOUNTER — APPOINTMENT (OUTPATIENT)
Age: 40
End: 2025-01-06
Payer: OTHER MISCELLANEOUS

## 2025-01-06 PROCEDURE — 99213 OFFICE O/P EST LOW 20 MIN: CPT | Performed by: PREVENTIVE MEDICINE

## 2025-01-27 ENCOUNTER — TELEPHONE (OUTPATIENT)
Age: 40
End: 2025-01-27

## 2025-01-27 NOTE — TELEPHONE ENCOUNTER
Caller: Jessica willis     Doctor: Loco    Reason for call: Calling to confirm last appt for patient and if he has any upcoming appts.        Call back#: [phone]

## 2025-02-03 ENCOUNTER — APPOINTMENT (OUTPATIENT)
Age: 40
End: 2025-02-03
Payer: OTHER MISCELLANEOUS

## 2025-02-03 PROCEDURE — 99213 OFFICE O/P EST LOW 20 MIN: CPT | Performed by: PREVENTIVE MEDICINE

## 2025-04-18 ENCOUNTER — TRANSCRIBE ORDERS (OUTPATIENT)
Facility: HOSPITAL | Age: 40
End: 2025-04-18

## 2025-04-18 DIAGNOSIS — Z13.9 SCREENING FOR UNSPECIFIED CONDITION: Primary | ICD-10-CM
